# Patient Record
Sex: FEMALE | Race: WHITE | NOT HISPANIC OR LATINO | URBAN - METROPOLITAN AREA
[De-identification: names, ages, dates, MRNs, and addresses within clinical notes are randomized per-mention and may not be internally consistent; named-entity substitution may affect disease eponyms.]

---

## 2017-12-22 ENCOUNTER — INPATIENT (INPATIENT)
Facility: HOSPITAL | Age: 62
LOS: 3 days | Discharge: HOME | DRG: 123 | End: 2017-12-26
Attending: PSYCHIATRY & NEUROLOGY | Admitting: PSYCHIATRY & NEUROLOGY
Payer: COMMERCIAL

## 2017-12-22 VITALS
WEIGHT: 164.91 LBS | OXYGEN SATURATION: 98 % | SYSTOLIC BLOOD PRESSURE: 112 MMHG | HEART RATE: 77 BPM | TEMPERATURE: 98 F | RESPIRATION RATE: 18 BRPM | DIASTOLIC BLOOD PRESSURE: 66 MMHG

## 2017-12-22 LAB
ANION GAP SERPL CALC-SCNC: 12 MMOL/L — SIGNIFICANT CHANGE UP (ref 5–17)
BUN SERPL-MCNC: 12 MG/DL — SIGNIFICANT CHANGE UP (ref 7–23)
CALCIUM SERPL-MCNC: 9.9 MG/DL — SIGNIFICANT CHANGE UP (ref 8.4–10.5)
CHLORIDE SERPL-SCNC: 103 MMOL/L — SIGNIFICANT CHANGE UP (ref 96–108)
CO2 SERPL-SCNC: 27 MMOL/L — SIGNIFICANT CHANGE UP (ref 22–31)
CREAT SERPL-MCNC: 0.91 MG/DL — SIGNIFICANT CHANGE UP (ref 0.5–1.3)
GLUCOSE SERPL-MCNC: 103 MG/DL — HIGH (ref 70–99)
POTASSIUM SERPL-MCNC: 4.2 MMOL/L — SIGNIFICANT CHANGE UP (ref 3.5–5.3)
POTASSIUM SERPL-SCNC: 4.2 MMOL/L — SIGNIFICANT CHANGE UP (ref 3.5–5.3)
SODIUM SERPL-SCNC: 142 MMOL/L — SIGNIFICANT CHANGE UP (ref 135–145)

## 2017-12-22 PROCEDURE — 99220: CPT

## 2017-12-22 RX ORDER — SODIUM CHLORIDE 9 MG/ML
3 INJECTION INTRAMUSCULAR; INTRAVENOUS; SUBCUTANEOUS EVERY 8 HOURS
Qty: 0 | Refills: 0 | Status: DISCONTINUED | OUTPATIENT
Start: 2017-12-22 | End: 2017-12-26

## 2017-12-22 RX ADMIN — SODIUM CHLORIDE 3 MILLILITER(S): 9 INJECTION INTRAMUSCULAR; INTRAVENOUS; SUBCUTANEOUS at 20:56

## 2017-12-22 NOTE — ED CDU PROVIDER INITIAL DAY NOTE - DETAILS
vital signs q8h, pain control, MRI, Ophthalmology consult, frequent re-evaluations  case d/w Dr. Fox

## 2017-12-22 NOTE — ED PROVIDER NOTE - OBJECTIVE STATEMENT
62F with no reported PMH presents to the ED with L eye pain.  Reports that the pain started three days ago.  Reports pain worse with looking to the L.  Reports that yesterday noted shimmering in her L peripheral vision as well as a gray bar in central vision.  Reports went to optometrist today and was sent to the ED for concern for optic neuritis.  Denies fevers, chills, dizziness, weakness.  Denies double vision, sudden loss of vision. Denies numbness/weakness/tingling in extremities. Denies headache, back pain, neck pain.  Denies chest pain, shortness of breath, palpitations. Denies abdominal pain, nausea, vomiting, diarrhea, blood in stools. Denies dysuria, hematuria, change in urinary habits including frequency, urgency. Patient brought VF.  On exam, NAD, head NCAT, PERRL, EOMI, confrontational VF full, Va cc OD 20/20, OS 20/20, color plates on phone: full, no periorbital ecchymosis, no tenderness to palpation of orbital rim, lid margins clear, no retained foreign body on lid eversion, no conjunctival injection, no corneal defect, AC no cells or flare, fundus exam limited, disc margins sharp and flat, FROM at neck, no tenderness to palpation or stepoffs along length of spine, lungs CTAB with good inspiratory effort, +S1S2, no m/r/g, abdomen soft, NT, moving all extremities with 5/5 strength bilateral upper and lower extremities, good and equal  strength bilaterally; A/P: 62F with visual complaints with no acute findings for me, will consult ophtho as was sent in for optic neuritis, will await

## 2017-12-22 NOTE — ED PROVIDER NOTE - PROGRESS NOTE DETAILS
Attending MD Fox: Whit in ED evaluating patient Attending MD Fox: Seen by ophtho, low suspicion for optic neuritis but recommend MRI.  Spoke to CDU will send to CDU. Attending MD Fox: Seen by ophtho, low suspicion for optic neuritis but recommend MRI.  No other labs needed. Spoke to CDU will send to CDU.

## 2017-12-22 NOTE — ED ADULT NURSE NOTE - PSH
Herniated Disc  lumbar, s/p discectomy  History of Bladder Repair Surgery  s/p bladder suspension  S/P Tubal Ligation

## 2017-12-22 NOTE — ED ADULT NURSE NOTE - CHPI ED SYMPTOMS NEG
no discharge/no drainage/no itching/no photophobia/no double vision/no foreign body/no eye lid swelling/no purulent drainage

## 2017-12-22 NOTE — ED CDU PROVIDER INITIAL DAY NOTE - PHYSICAL EXAMINATION
Eye exam could not be performed as patient's pupils were dilated at time of exam.  please see Ophthalmology note for full exam.

## 2017-12-22 NOTE — ED CDU PROVIDER INITIAL DAY NOTE - ATTENDING CONTRIBUTION TO CARE
Attending MD Fox:   I personally have seen and examined this patient.  Physician assistant note reviewed and agree on plan of care and except where noted.  See below for details.     62F with no reported PMH presents to the ED with L eye pain.  Reports that the pain started three days ago.  Reports pain worse with looking to the L.  Reports that yesterday noted shimmering in her L peripheral vision as well as a gray bar in central vision.  Reports went to optometrist today and was sent to the ED for concern for optic neuritis.  Denies fevers, chills, dizziness, weakness.  Denies double vision, sudden loss of vision. Denies numbness/weakness/tingling in extremities. Denies headache, back pain, neck pain.  Denies chest pain, shortness of breath, palpitations. Denies abdominal pain, nausea, vomiting, diarrhea, blood in stools. Denies dysuria, hematuria, change in urinary habits including frequency, urgency. Patient brought VF.  On exam, NAD, head NCAT, PERRL, EOMI, confrontational VF full, Va cc OD 20/20, OS 20/20, color plates on phone: full, no periorbital ecchymosis, no tenderness to palpation of orbital rim, lid margins clear, no retained foreign body on lid eversion, no conjunctival injection, no corneal defect, AC no cells or flare, fundus exam limited, disc margins sharp and flat, FROM at neck, no tenderness to palpation or stepoffs along length of spine, lungs CTAB with good inspiratory effort, +S1S2, no m/r/g, abdomen soft, NT, moving all extremities with 5/5 strength bilateral upper and lower extremities, good and equal  strength bilaterally; A/P: 62F with visual complaints with no acute findings for me, seen by ophtho, low suspicion for optic neuritis, but recommend MRI, will send to CDU for MRI and re-eval by ophtho +/- neuro pending resolution or persistence of symptoms

## 2017-12-22 NOTE — CONSULT NOTE ADULT - SUBJECTIVE AND OBJECTIVE BOX
[facility] Consult Note    HPI: 63yo F no pmh sent in by ophthalmic consults of [place] to rule out optic neuritis of the left eye. Pt reports mild 2/10 pain OS when looking to left side. And some "shimmering" in peripheral field of vision x3 days. No other complaints. No flashes/floaters. No changes in vision. No HA. HVF (unreliable) at office today showed inf nasal step OS, full OD.    PMH: None  Meds: None  POcHx (including surgeries/lasers/trauma):  None  Drops: None  FamHx: None  Social Hx: None  Allergies: NKDA    ROS:  General (neg), Vision (per HPI), Head and Neck (neg), Pulm (neg), CV (neg), GI (neg),  (neg), Musculoskeletal (neg), Skin/Integ (neg), Neuro (neg), Endocrine (neg), Heme (neg), All/Immuno (neg)    Mood and Affect Appropriate ( x3 ),  Oriented to Time, Place, and Person x 3 (    Ophthalmology Exam    Visual acuity cc near: 20/20  Pupils: PERRL OU, no APD  Ttono: 12 OU  Extraocular movements (EOMs): Full OU, no diplopia  Confrontational Visual Field (CVF):  Full OU  Color Plates: 12/12 OU    Slit Lamp Exam (SLE)  External:  Flat OU  Lids/Lashes/Lacrimal Ducts: Flat OU    Sclera/Conjunctiva:  W+Q OU  Cornea: Cl OU  Anterior Chamber: D+F OU   Iris:  Flat OU  Lens:  Cl OU    Fundus Exam: dilated with 1% tropicamide and 2.5% phenylephrine   Approval obtained from primary team for dilation  Patient aware that pupils can remained dilated for at least 4-6 hours  Exam performed with 20D lens    Vitreous: wnl OU  Cup/Disc: 0.3 OU  Macula:  wnl OU  Vessels:  wnl OU  Periphery: wnl OU    Assessment/Plan: 63yo F with mild pain OS with EOM. VA excellent, colors full, no APD. Nerves sharp and pink OU. Very low suspicion for optic neuritis.  - recommend obtaining MRI orbit left eye with contrast (optic neuritis protocol) to rule out retrobulbar optic neuritis, please page ophtho oncall with results  - pt to follow up with her private ophthalmologist this tuesday, or with our office if she wishes    Case discussed with Dr. Hollins    [facility]   [address] [place] [address].  [place] [address]  [phone] (practice) or [phone] (clinic)

## 2017-12-22 NOTE — ED CDU PROVIDER INITIAL DAY NOTE - OBJECTIVE STATEMENT
61 yo female with no PMHx p/w left eye pain.  The patient reports that she developed left eye pain with lateral gaze 3 days ago.  Pain 2/10 in severity.  Yesterday she noted some blurring of her peripheral vision of the left eye.  Went to optometrist today who sent her to the ED for concern for optic neuritis.  Patient denies fevers/chills, CP, SOB, abd pain, NVDC

## 2017-12-22 NOTE — ED ADULT NURSE NOTE - OBJECTIVE STATEMENT
Patient presents to ED sent from eye doctor for blurry vision and soreness in the left eye. Patient denies injury to the eye. There is no ecchymosis, bruising, redness, Patient presents to ED sent from eye doctor for blurry vision and soreness in the left eye. Patient denies injury to the eye. There is no ecchymosis, bruising, redness, or swelling to the affected eye. Patient c/o blurry vision and states "it feels like I am wearing glasses and one side is smudged". Patient denies itchiness in the eye. There is no apparent foreign body in the eye. There is no drainage or tearing. There is no acute distress at this time. Safety and comfort maintained. Will continue to monitor.

## 2017-12-23 DIAGNOSIS — H53.9 UNSPECIFIED VISUAL DISTURBANCE: ICD-10-CM

## 2017-12-23 LAB
BASOPHILS # BLD AUTO: 0.1 K/UL — SIGNIFICANT CHANGE UP (ref 0–0.2)
BASOPHILS NFR BLD AUTO: 0.9 % — SIGNIFICANT CHANGE UP (ref 0–2)
EOSINOPHIL # BLD AUTO: 0.2 K/UL — SIGNIFICANT CHANGE UP (ref 0–0.5)
EOSINOPHIL NFR BLD AUTO: 1.9 % — SIGNIFICANT CHANGE UP (ref 0–6)
ERYTHROCYTE [SEDIMENTATION RATE] IN BLOOD: 9 MM/HR — SIGNIFICANT CHANGE UP (ref 0–20)
HCT VFR BLD CALC: 40.3 % — SIGNIFICANT CHANGE UP (ref 34.5–45)
HGB BLD-MCNC: 14 G/DL — SIGNIFICANT CHANGE UP (ref 11.5–15.5)
LYMPHOCYTES # BLD AUTO: 2.7 K/UL — SIGNIFICANT CHANGE UP (ref 1–3.3)
LYMPHOCYTES # BLD AUTO: 32.2 % — SIGNIFICANT CHANGE UP (ref 13–44)
MCHC RBC-ENTMCNC: 30.9 PG — SIGNIFICANT CHANGE UP (ref 27–34)
MCHC RBC-ENTMCNC: 34.7 GM/DL — SIGNIFICANT CHANGE UP (ref 32–36)
MCV RBC AUTO: 88.8 FL — SIGNIFICANT CHANGE UP (ref 80–100)
MONOCYTES # BLD AUTO: 0.6 K/UL — SIGNIFICANT CHANGE UP (ref 0–0.9)
MONOCYTES NFR BLD AUTO: 7.5 % — SIGNIFICANT CHANGE UP (ref 2–14)
NEUTROPHILS # BLD AUTO: 4.9 K/UL — SIGNIFICANT CHANGE UP (ref 1.8–7.4)
NEUTROPHILS NFR BLD AUTO: 57.6 % — SIGNIFICANT CHANGE UP (ref 43–77)
PLATELET # BLD AUTO: 301 K/UL — SIGNIFICANT CHANGE UP (ref 150–400)
RBC # BLD: 4.54 M/UL — SIGNIFICANT CHANGE UP (ref 3.8–5.2)
RBC # FLD: 11.8 % — SIGNIFICANT CHANGE UP (ref 10.3–14.5)
WBC # BLD: 8.4 K/UL — SIGNIFICANT CHANGE UP (ref 3.8–10.5)
WBC # FLD AUTO: 8.4 K/UL — SIGNIFICANT CHANGE UP (ref 3.8–10.5)

## 2017-12-23 PROCEDURE — 71020: CPT | Mod: 26

## 2017-12-23 PROCEDURE — 70543 MRI ORBT/FAC/NCK W/O &W/DYE: CPT | Mod: 26

## 2017-12-23 PROCEDURE — 99217: CPT

## 2017-12-23 RX ORDER — PANTOPRAZOLE SODIUM 20 MG/1
40 TABLET, DELAYED RELEASE ORAL
Qty: 0 | Refills: 0 | Status: DISCONTINUED | OUTPATIENT
Start: 2017-12-23 | End: 2017-12-26

## 2017-12-23 RX ORDER — INFLUENZA VIRUS VACCINE 15; 15; 15; 15 UG/.5ML; UG/.5ML; UG/.5ML; UG/.5ML
0.5 SUSPENSION INTRAMUSCULAR ONCE
Qty: 0 | Refills: 0 | Status: DISCONTINUED | OUTPATIENT
Start: 2017-12-23 | End: 2017-12-26

## 2017-12-23 RX ORDER — ACETAMINOPHEN 500 MG
975 TABLET ORAL ONCE
Qty: 0 | Refills: 0 | Status: COMPLETED | OUTPATIENT
Start: 2017-12-23 | End: 2017-12-23

## 2017-12-23 RX ADMIN — Medication 975 MILLIGRAM(S): at 15:46

## 2017-12-23 RX ADMIN — SODIUM CHLORIDE 3 MILLILITER(S): 9 INJECTION INTRAMUSCULAR; INTRAVENOUS; SUBCUTANEOUS at 13:15

## 2017-12-23 RX ADMIN — SODIUM CHLORIDE 3 MILLILITER(S): 9 INJECTION INTRAMUSCULAR; INTRAVENOUS; SUBCUTANEOUS at 05:03

## 2017-12-23 RX ADMIN — Medication 975 MILLIGRAM(S): at 11:32

## 2017-12-23 RX ADMIN — SODIUM CHLORIDE 3 MILLILITER(S): 9 INJECTION INTRAMUSCULAR; INTRAVENOUS; SUBCUTANEOUS at 21:49

## 2017-12-23 NOTE — H&P ADULT - ATTENDING COMMENTS
Patient personally seen and examined and agree with above.  C/T spine to be done today and will f/u.  LP for further w/u and LP to be performed.

## 2017-12-23 NOTE — ED ADULT NURSE REASSESSMENT NOTE - MUSCULOSKELETAL WDL
See phone message 11-15-16. Detailed message from 's office. Had called [facility] at home and was told pt needs sleep study notes from provider that conducted sleep study. Patient was informed.   Full range of motion of upper and lower extremities, no joint tenderness/swelling.

## 2017-12-23 NOTE — CONSULT NOTE ADULT - SUBJECTIVE AND OBJECTIVE BOX
Ophthalmology follow up note    Subjective: No pain, no changes in vision. Feels same as yesterday. Still with mild pain OS with looking laterally, improved from yesterday. Still sees "shimmers" in peripheral field of vision and gray line in center of vision     Ophthalmology Exam    Visual acuity cc near: 20/20  Pupils: brisk and reactive OD, 2+ APD OS  Ttono: 12 OU  Extraocular movements (EOMs): Full OU, no diplopia  Confrontational Visual Field (CVF):  Full OU  Color Plates: 12/12 OU  No red desaturation    Slit Lamp Exam (SLE)  External:  Flat OU  Lids/Lashes/Lacrimal Ducts: Flat OU    Sclera/Conjunctiva:  W+Q OU  Cornea: Cl OU  Anterior Chamber: D+F OU   Iris:  Flat OU  Lens:  Cl OU    Fundus Exam: dilated with 1% tropicamide and 2.5% phenylephrine   Approval obtained from primary team for dilation  Patient aware that pupils can remained dilated for at least 4-6 hours  Exam performed with 20D lens    Vitreous: wnl OU  Cup/Disc: sharp and pink, 0.3 OU  Macula:  wnl OU  Vessels:  wnl OU  Periphery: wnl OU    MRI ORBIT: Evidence of T2 signal hyperintensity in the left optic nerve in its   intraorbital segment with some associated enhancement consistent with   optic neuritis. Intracranially there is evidence of signal hyperintensity   on T2 and FLAIR in the periventricular and subcortical white matter.   These lesions may be on the basis of demyelinating pathology given the   presence of optic neuritis though ischemic infectious or inflammatory   etiologies are not excluded and can be a cause of optic neuritis as well.   Of note the intracranial lesions do not enhance. The intracranial lesions   also do not demonstrate signal hyperintensity on the diffusion-weighted   sequence.      Assessment/Plan: 63yo F with mild pain OS with EOM and 2+ APD OS. VA excellent, colors full. No signs of nerve swelling seen clinically however MRI with evidence of optic neuritis OS. There is T2 signal hyperintensity in left optic nerve as well as periventricular and subcortical white matter. Will need to eval for inflammatory vs infectious etiology.  - recommend neurology consult  - please obtain CBC, ESR, ACE, lyme antibody, RPR, chest x ray  - pt to follow up with her private ophthalmologist within 1wk from discharge

## 2017-12-23 NOTE — H&P ADULT - ASSESSMENT
63yo F no pmh sent in by ophthalmic consults of [place] to rule out optic neuritis of the left eye. Pt reports mild 2/10 pain in left eye when looking to left side. And some "shimmering" in peripheral field of vision x3 days. No other complaints. No flashes/floaters. No changes in vision. No HA. MRI showed evidence of optic neuritis.     Impression     Optic neuritis due to MS vs NMO     Plan     Solumedrol 1 gram for 3 days or 5 days depends on patient's clinical improvement   Pantoprazole   Lumbar puncture and CSF analysis ( csf count, glucose, protein, OCB, myelin basic protein, csf index )   MRI spine Cervical and thoracic spine with contrast   NMO antibody   MELIA   DVT prophylaxis

## 2017-12-23 NOTE — ED ADULT NURSE REASSESSMENT NOTE - GENERAL PATIENT STATE
smiling/interactive/comfortable appearance/cooperative
comfortable appearance
comfortable appearance/cooperative/smiling/interactive

## 2017-12-23 NOTE — ED CDU PROVIDER SUBSEQUENT DAY NOTE - PROGRESS NOTE DETAILS
Patient seen at bedside in NAD.  VSS.  Patient resting comfortably.  Sleeping.  -Glenn Duron PA-C Patient seen and evaluated at bedside. Resting comfortably, NAD. Still reports L eye pain with lateral gaze and "shimmering" in peripheral field of vision. Also c/o mild HA. Denies fever/chills, central vision changes, numbness/tingling. VSS. Plan for MRI today. Patient resting comfortably in NAD. No change in symptoms. VSS. Pending MRI results. MRI significant for optic neuritis. Optho resident aware and will come see patient in CDU. MRI significant for optic neuritis with additional subcortical and periventricular signal hyperintensity signal hyperintensity. Optho resident aware and will come see patient in CDU. Neuro paged. Patient re-evaluated by ophthalmology resident. Awaiting final recs, however reports patient plan pending neuro evaluation. Spoke with neuro resident again reports he will see patient in CDU shortly, pt likely to be admitted to neuro service. Patient re-evaluated by ophthalmology resident. Awaiting final recs, however reports patient plan and steroid tx pending neuro evaluation. Spoke with neuro resident again reports he will see patient in CDU shortly, pt likely to be admitted to neuro service. MRI significant for optic neuritis with additional subcortical and periventricular signal hyperintensity. Optho resident aware and will come see patient in CDU. Neuro paged. Attending MD Mckeon.  Pt has been seen by optho and MRI c/w optic neuritis.  She is stable and pending neuro consult at time of signout to Dr. Fierro.  Neuro recommending likely admission and probable high dose steroids but recommending hold admission/steroids until they see pt.  Neuro has been called several times by AVA Fitzgerald and have several strokes ahead of this pt.  She remains stable pending neuro eval. CDU NOTE AVA FERRIS: Pt resting comfortably, states vision is unchanged, has line across vision on L eye with some blurriness. still has some mild eye pain of L eye when looking left. NAD, VSS. Eyes still dilated from optho exam, EOMI b/l. pt seen by neuro, recommend admission. case and plan discussed with Dr. Fierro; agrees with admission.

## 2017-12-23 NOTE — ED ADULT NURSE REASSESSMENT NOTE - NS ED NURSE REASSESS COMMENT FT1
17.00 Pt  completed her tests Opthalmic team evaluated the pt  due bloods sent Pt denies N/V/D fever chills cp sob Afebrile here  Pt is awaiting for Neuro consult continue to monitor
Pt asleep on stretcher, no complaints, VSS, monitoring continues, safety and comfort maintained.
Pt asleep on stretcher, no complaints, VSS, monitoring continues. Safety and comfort maintained.
opthalmology in eye room with pt
7.30 Am Pt is reassessed Nasreen N/V/D fever chills CP SOB afebrile here Pt looks comfortable. Continue to monitor   08.30 Am  Pt went for MRI
Received pt from JACKIE Pimentel (fast track), received pt alert and responsive, oriented x4, denies any respiratory distress, SOB, or difficulty breathing. Pt transferred to CDU for visual disturbance/ blurred vision and pain to L eye. Pt states she sees "sparkles" and vision is blurred from L eye and pain with L eye movement, denies pain when eyes looking forward no movement. Pt is pending MRI orbits, pt aware.  IV in place, patent and free of signs of infiltration,  pt denies chest pain or palpitations, V/S stable, pt afebrile, pt denies pain at this time, no distress noted,  no other deficits noted at this time.  Pt educated on unit and unit rules, instructed patient to notify RN of any needed assistance, Pt verbalizes understanding, Call bell placed within reach. Safety maintained. Will continue to monitor.
Pt received from JACKIE Rutledge. Pt oriented to CDU & plan of care was discussed. Pt states she feels the same. She has 3-4/10 L eye soreness and states "it feels like a black & blue". Pt states the eye is also blurry but denies any double vision. Safety & comfort measures maintained. Call bell in reach. Will continue to monitor.

## 2017-12-23 NOTE — ED CDU PROVIDER SUBSEQUENT DAY NOTE - MEDICAL DECISION MAKING DETAILS
Attending MD Mckeon.  Pt seen and examined by me on morning of 12/23 in CDU observation unit.  Pt is a 62 yr old female who presented to Ed with complaint of L visual changes.  Pt has no other medical hx.  Pt endorses L eye pain with lateral gaze and vision changes she describes as ‘shimmering’ isolated to L eye.  No involvement of R eye.  No numbness/tingling/fevers.  Pt saw her optho who was concerned for optic neuritis.  Optho in ED did not feel likely optic neuritis but recommended MRI. Pt is in MRI at this time to return and be reassessed/obtain results.  Plan to rule-out retrobulbar optic neuritis.  If non-actionable MRI plan to d/c with optho follow-up.  Will call optho with MRI results.

## 2017-12-23 NOTE — H&P ADULT - HISTORY OF PRESENT ILLNESS
61yo F no pmh sent in by ophthalmic consults of [place] to rule out optic neuritis of the left eye. Pt reports mild 2/10 pain in left eye when looking to left side. And some "shimmering" in peripheral field of vision x3 days. No other complaints. No flashes/floaters. No changes in vision. No HA.     She denied any weakness, numbness, change in speech or coordination and headache. No family h/o for MS and NMO or any demyelinating disease.

## 2017-12-23 NOTE — ED CDU PROVIDER DISPOSITION NOTE - CLINICAL COURSE
61 yo female with no PMHx p/w left eye pain.  The patient reports that she developed left eye pain with lateral gaze 3 days ago.  Pain 2/10 in severity.  Yesterday she noted some blurring of her peripheral vision of the left eye.  Went to optometrist today who sent her to the ED for concern for optic neuritis.  Patient denies fevers/chills, CP, SOB, abd pain, NVDC.   In the ED VSS.  Patient was evaluated by Ophthalmology who recommended an MRI to r/o optic neuritis.  Patient was placed in the CDU.  MRI................... 61 yo female with no PMHx p/w left eye pain.  The patient reports that she developed left eye pain with lateral gaze 3 days ago.  Pain 2/10 in severity.  Yesterday she noted some blurring of her peripheral vision of the left eye.  Went to optometrist today who sent her to the ED for concern for optic neuritis.  Patient denies fevers/chills, CP, SOB, abd pain, NVDC.   In the ED VSS.  Patient was evaluated by Ophthalmology who recommended an MRI to r/o optic neuritis.  Patient was placed in the CDU.  No change in symptoms during CDU stay. MRI orbit significant for optic neuritis with additional subcortical and periventricular signal hyperintensity.  Seen by ophtho and neurology. Neuro ... 61 yo female with no PMHx p/w left eye pain.  The patient reports that she developed left eye pain with lateral gaze 3 days ago.  Pain 2/10 in severity.  Yesterday she noted some blurring of her peripheral vision of the left eye.  Went to optometrist today who sent her to the ED for concern for optic neuritis.  Patient denies fevers/chills, CP, SOB, abd pain, NVDC.   In the ED VSS.  Patient was evaluated by Ophthalmology who recommended an MRI to r/o optic neuritis.  Patient was placed in the CDU.  No change in symptoms during CDU stay. MRI orbit significant for optic neuritis with additional subcortical and periventricular signal hyperintensity.  Seen by ophtho and neurology. Neuro recommending admission.

## 2017-12-23 NOTE — ED CDU PROVIDER SUBSEQUENT DAY NOTE - HISTORY
no interval change from initial CDU provider note.  Patient seen at bedside in NAD.  VSS.  Patient resting comfortably without complaints. -Glenn Duron PA-C

## 2017-12-23 NOTE — ED ADULT NURSE REASSESSMENT NOTE - COMFORT CARE
po fluids offered/repositioned/warm blanket provided/ambulated to bathroom/plan of care explained/darkened lights
meal provided/plan of care explained/po fluids offered
side rails up/assisted to bathroom/plan of care explained/po fluids offered/repositioned

## 2017-12-23 NOTE — ED CDU PROVIDER DISPOSITION NOTE - ATTENDING CONTRIBUTION TO CARE
Attending MD Mckeon.  Pt seen and examined by me on morning of 12/23 in CDU observation unit.  Pt is a 62 yr old female who presented to Ed with complaint of L visual changes.  Pt has no other medical hx.  Pt endorses L eye pain with lateral gaze and vision changes she describes as ‘shimmering’ isolated to L eye.  No involvement of R eye.  No numbness/tingling/fevers.  Pt saw her optho who was concerned for optic neuritis.  Optho in ED did not feel likely optic neuritis but recommended MRI. Pt is in MRI at this time to return and be reassessed/obtain results.  Plan to rule-out retrobulbar optic neuritis.  If non-actionable MRI plan to d/c with optho follow-up.  Will call optho with MRI results. KHURRAM: I have personally performed a face to face diagnostic evaluation on this patient.  I have reviewed the ACP note and agree with the history, exam, and plan of care, except as noted.  History and Exam by me shows pt with eye pain on lateral gaze, optometrist concerned for optic neuritis, sent to ED. on exam, well appearing female.CN2-12 intact. sent to cdu for MRI/neuro/ophtho. recommend admission for high dose steroids.

## 2017-12-23 NOTE — H&P ADULT - NSHPLABSRESULTS_GEN_ALL_CORE
14.0   8.4   )-----------( 301      ( 23 Dec 2017 18:04 )             40.3   12-22    142  |  103  |  12  ----------------------------<  103<H>  4.2   |  27  |  0.91    Ca    9.9      22 Dec 2017 21:06      < from: MR Orbits w/wo IV Cont (12.23.17 @ 09:53) >    Evidence of T2 signal hyperintensity in the left optic nerve in its   intraorbital segment with some associated enhancement consistent with   optic neuritis. Intracranially there is evidence of signal hyperintensity   on T2 and FLAIR in the periventricular and subcortical white matter.   These lesions may be on the basis of demyelinating pathology given the   presence of optic neuritis though ischemic infectious or inflammatory   etiologies are not excluded and can be a cause of optic neuritis as well.   Of note the intracranial lesions do not enhance. The intracranial lesions   also do not demonstrate signal hyperintensity on the diffusion-weighted   sequence.      < end of copied text >

## 2017-12-23 NOTE — ED CDU PROVIDER DISPOSITION NOTE - PLAN OF CARE
1.  Stay hydrated  2.  Follow up with your PMD in 2-3 days.  bring a copy of your results with you to your appointment       Follow up with your Ophthalmologist upon discharge.  3.  Return to the ER for visual changes, worsening eye pain, fevers/chills or any other concerning symptoms

## 2017-12-24 DIAGNOSIS — G96.19 OTHER DISORDERS OF MENINGES, NOT ELSEWHERE CLASSIFIED: ICD-10-CM

## 2017-12-24 LAB
ALBUMIN SERPL ELPH-MCNC: 3.9 G/DL — SIGNIFICANT CHANGE UP (ref 3.3–5)
ALP SERPL-CCNC: 56 U/L — SIGNIFICANT CHANGE UP (ref 40–120)
ALT FLD-CCNC: 21 U/L — SIGNIFICANT CHANGE UP (ref 10–45)
ANA TITR SER: NEGATIVE — SIGNIFICANT CHANGE UP
ANION GAP SERPL CALC-SCNC: 12 MMOL/L — SIGNIFICANT CHANGE UP (ref 5–17)
AST SERPL-CCNC: 17 U/L — SIGNIFICANT CHANGE UP (ref 10–40)
B BURGDOR C6 AB SER-ACNC: NEGATIVE — SIGNIFICANT CHANGE UP
B BURGDOR IGG+IGM SER-ACNC: 0.11 INDEX — SIGNIFICANT CHANGE UP (ref 0.01–0.89)
BASOPHILS # BLD AUTO: 0.03 K/UL — SIGNIFICANT CHANGE UP (ref 0–0.2)
BASOPHILS NFR BLD AUTO: 0.4 % — SIGNIFICANT CHANGE UP (ref 0–2)
BILIRUB SERPL-MCNC: 0.3 MG/DL — SIGNIFICANT CHANGE UP (ref 0.2–1.2)
BUN SERPL-MCNC: 15 MG/DL — SIGNIFICANT CHANGE UP (ref 7–23)
CALCIUM SERPL-MCNC: 9.2 MG/DL — SIGNIFICANT CHANGE UP (ref 8.4–10.5)
CHLORIDE SERPL-SCNC: 105 MMOL/L — SIGNIFICANT CHANGE UP (ref 96–108)
CO2 SERPL-SCNC: 23 MMOL/L — SIGNIFICANT CHANGE UP (ref 22–31)
CREAT SERPL-MCNC: 0.91 MG/DL — SIGNIFICANT CHANGE UP (ref 0.5–1.3)
EOSINOPHIL # BLD AUTO: 0.18 K/UL — SIGNIFICANT CHANGE UP (ref 0–0.5)
EOSINOPHIL NFR BLD AUTO: 2.3 % — SIGNIFICANT CHANGE UP (ref 0–6)
GLUCOSE BLDC GLUCOMTR-MCNC: 208 MG/DL — HIGH (ref 70–99)
GLUCOSE BLDC GLUCOMTR-MCNC: 216 MG/DL — HIGH (ref 70–99)
GLUCOSE SERPL-MCNC: 102 MG/DL — HIGH (ref 70–99)
HCT VFR BLD CALC: 40.1 % — SIGNIFICANT CHANGE UP (ref 34.5–45)
HGB BLD-MCNC: 13.1 G/DL — SIGNIFICANT CHANGE UP (ref 11.5–15.5)
IMM GRANULOCYTES NFR BLD AUTO: 0.1 % — SIGNIFICANT CHANGE UP (ref 0–1.5)
LYMPHOCYTES # BLD AUTO: 3.18 K/UL — SIGNIFICANT CHANGE UP (ref 1–3.3)
LYMPHOCYTES # BLD AUTO: 40.7 % — SIGNIFICANT CHANGE UP (ref 13–44)
MCHC RBC-ENTMCNC: 28.4 PG — SIGNIFICANT CHANGE UP (ref 27–34)
MCHC RBC-ENTMCNC: 32.7 GM/DL — SIGNIFICANT CHANGE UP (ref 32–36)
MCV RBC AUTO: 87 FL — SIGNIFICANT CHANGE UP (ref 80–100)
MONOCYTES # BLD AUTO: 0.56 K/UL — SIGNIFICANT CHANGE UP (ref 0–0.9)
MONOCYTES NFR BLD AUTO: 7.2 % — SIGNIFICANT CHANGE UP (ref 2–14)
NEUTROPHILS # BLD AUTO: 3.86 K/UL — SIGNIFICANT CHANGE UP (ref 1.8–7.4)
NEUTROPHILS NFR BLD AUTO: 49.3 % — SIGNIFICANT CHANGE UP (ref 43–77)
PLATELET # BLD AUTO: 278 K/UL — SIGNIFICANT CHANGE UP (ref 150–400)
POTASSIUM SERPL-MCNC: 4.1 MMOL/L — SIGNIFICANT CHANGE UP (ref 3.5–5.3)
POTASSIUM SERPL-SCNC: 4.1 MMOL/L — SIGNIFICANT CHANGE UP (ref 3.5–5.3)
PROT SERPL-MCNC: 6.6 G/DL — SIGNIFICANT CHANGE UP (ref 6–8.3)
RBC # BLD: 4.61 M/UL — SIGNIFICANT CHANGE UP (ref 3.8–5.2)
RBC # FLD: 13.3 % — SIGNIFICANT CHANGE UP (ref 10.3–14.5)
SODIUM SERPL-SCNC: 140 MMOL/L — SIGNIFICANT CHANGE UP (ref 135–145)
T PALLIDUM AB TITR SER: NEGATIVE — SIGNIFICANT CHANGE UP
TSH SERPL-MCNC: 4.72 UIU/ML — HIGH (ref 0.27–4.2)
VIT B12 SERPL-MCNC: 330 PG/ML — SIGNIFICANT CHANGE UP (ref 232–1245)
WBC # BLD: 7.82 K/UL — SIGNIFICANT CHANGE UP (ref 3.8–10.5)
WBC # FLD AUTO: 7.82 K/UL — SIGNIFICANT CHANGE UP (ref 3.8–10.5)

## 2017-12-24 PROCEDURE — 99223 1ST HOSP IP/OBS HIGH 75: CPT

## 2017-12-24 PROCEDURE — 72156 MRI NECK SPINE W/O & W/DYE: CPT | Mod: 26

## 2017-12-24 PROCEDURE — 72157 MRI CHEST SPINE W/O & W/DYE: CPT | Mod: 26

## 2017-12-24 PROCEDURE — 88188 FLOWCYTOMETRY/READ 9-15: CPT

## 2017-12-24 RX ORDER — DEXTROSE 50 % IN WATER 50 %
25 SYRINGE (ML) INTRAVENOUS ONCE
Qty: 0 | Refills: 0 | Status: DISCONTINUED | OUTPATIENT
Start: 2017-12-24 | End: 2017-12-26

## 2017-12-24 RX ORDER — GLUCAGON INJECTION, SOLUTION 0.5 MG/.1ML
1 INJECTION, SOLUTION SUBCUTANEOUS ONCE
Qty: 0 | Refills: 0 | Status: DISCONTINUED | OUTPATIENT
Start: 2017-12-24 | End: 2017-12-26

## 2017-12-24 RX ORDER — INSULIN LISPRO 100/ML
VIAL (ML) SUBCUTANEOUS
Qty: 0 | Refills: 0 | Status: DISCONTINUED | OUTPATIENT
Start: 2017-12-24 | End: 2017-12-26

## 2017-12-24 RX ORDER — SODIUM CHLORIDE 9 MG/ML
1000 INJECTION, SOLUTION INTRAVENOUS
Qty: 0 | Refills: 0 | Status: DISCONTINUED | OUTPATIENT
Start: 2017-12-24 | End: 2017-12-26

## 2017-12-24 RX ORDER — DEXTROSE 50 % IN WATER 50 %
12.5 SYRINGE (ML) INTRAVENOUS ONCE
Qty: 0 | Refills: 0 | Status: DISCONTINUED | OUTPATIENT
Start: 2017-12-24 | End: 2017-12-26

## 2017-12-24 RX ORDER — DEXTROSE 50 % IN WATER 50 %
1 SYRINGE (ML) INTRAVENOUS ONCE
Qty: 0 | Refills: 0 | Status: DISCONTINUED | OUTPATIENT
Start: 2017-12-24 | End: 2017-12-26

## 2017-12-24 RX ADMIN — PANTOPRAZOLE SODIUM 40 MILLIGRAM(S): 20 TABLET, DELAYED RELEASE ORAL at 05:14

## 2017-12-24 RX ADMIN — SODIUM CHLORIDE 3 MILLILITER(S): 9 INJECTION INTRAMUSCULAR; INTRAVENOUS; SUBCUTANEOUS at 21:25

## 2017-12-24 RX ADMIN — Medication 58 MILLIGRAM(S): at 05:14

## 2017-12-24 RX ADMIN — SODIUM CHLORIDE 3 MILLILITER(S): 9 INJECTION INTRAMUSCULAR; INTRAVENOUS; SUBCUTANEOUS at 05:16

## 2017-12-24 RX ADMIN — Medication: at 18:13

## 2017-12-24 RX ADMIN — SODIUM CHLORIDE 3 MILLILITER(S): 9 INJECTION INTRAMUSCULAR; INTRAVENOUS; SUBCUTANEOUS at 13:27

## 2017-12-24 NOTE — CONSULT NOTE ADULT - PROBLEM SELECTOR RECOMMENDATION 9
if patient has symptoms such as lower extremity numbness/weakness, she can f/u with Dr. Phipps as an outpatient.

## 2017-12-24 NOTE — CONSULT NOTE ADULT - SUBJECTIVE AND OBJECTIVE BOX
HPI: 62yof p/w vision changes being worked up for optic neuritis got MRI spinal axis today showing incidental thoracic spine arachnoid cysts. She has no weakness, paresthesias, or difficulty walking.  currently on high dose IV steroids. vision improved since admission.    PMHx: as above  PSHx: none  Medications: dextrose 5%.  dextrose 50% Injectable  dextrose 50% Injectable  dextrose 50% Injectable  dextrose Gel PRN  glucagon  Injectable PRN  influenza   Vaccine  insulin lispro (HumaLOG) corrective regimen sliding scale  methylPREDNISolone sodium succinate IVPB  pantoprazole    Tablet  sodium chloride 0.9% lock flush    Allergies: nkda  Social history: lives with , retired  Family History: none applicable    VS: T(C): 36.8 (12-24-17 @ 15:27)  HR: 98 (12-24-17 @ 15:27)  BP: 111/70 (12-24-17 @ 15:27)  RR: 17 (12-24-17 @ 15:27)  SpO2: 95% (12-24-17 @ 15:27)  Wt(kg): --                          13.1   7.82  )-----------( 278      ( 24 Dec 2017 06:35 )             40.1     12-24    140  |  105  |  15  ----------------------------<  102<H>  4.1   |  23  |  0.91    Ca    9.2      24 Dec 2017 06:26    TPro  6.6  /  Alb  3.9  /  TBili  0.3  /  DBili  x   /  AST  17  /  ALT  21  /  AlkPhos  56  12-24        Exam:  AAOx3  5/5 throughout, no pronator drift  Sensation intact to light touch throughout  Reflexes 2+ throughout  No dysmetria

## 2017-12-25 LAB
APTT BLD: 29.9 SEC — SIGNIFICANT CHANGE UP (ref 27.5–37.4)
GLUCOSE BLDC GLUCOMTR-MCNC: 129 MG/DL — HIGH (ref 70–99)
GLUCOSE BLDC GLUCOMTR-MCNC: 201 MG/DL — HIGH (ref 70–99)
GLUCOSE BLDC GLUCOMTR-MCNC: 209 MG/DL — HIGH (ref 70–99)
GLUCOSE BLDC GLUCOMTR-MCNC: 211 MG/DL — HIGH (ref 70–99)
HBA1C BLD-MCNC: 5.7 % — HIGH (ref 4–5.6)
INR BLD: 0.96 RATIO — SIGNIFICANT CHANGE UP (ref 0.88–1.16)
PROTHROM AB SERPL-ACNC: 10.8 SEC — SIGNIFICANT CHANGE UP (ref 10–13.1)

## 2017-12-25 PROCEDURE — 99233 SBSQ HOSP IP/OBS HIGH 50: CPT

## 2017-12-25 RX ADMIN — Medication 2: at 18:13

## 2017-12-25 RX ADMIN — SODIUM CHLORIDE 3 MILLILITER(S): 9 INJECTION INTRAMUSCULAR; INTRAVENOUS; SUBCUTANEOUS at 05:40

## 2017-12-25 RX ADMIN — SODIUM CHLORIDE 3 MILLILITER(S): 9 INJECTION INTRAMUSCULAR; INTRAVENOUS; SUBCUTANEOUS at 13:01

## 2017-12-25 RX ADMIN — PANTOPRAZOLE SODIUM 40 MILLIGRAM(S): 20 TABLET, DELAYED RELEASE ORAL at 05:39

## 2017-12-25 RX ADMIN — SODIUM CHLORIDE 3 MILLILITER(S): 9 INJECTION INTRAMUSCULAR; INTRAVENOUS; SUBCUTANEOUS at 21:20

## 2017-12-25 RX ADMIN — Medication 58 MILLIGRAM(S): at 05:39

## 2017-12-25 RX ADMIN — Medication 2: at 13:01

## 2017-12-25 NOTE — PROGRESS NOTE ADULT - ASSESSMENT
63yo F here for optic neuritis. L APD on exam. MRI orbits consistent with optic neuritis T2, flair lesions non-enhancing bilaterally periventruiuclar and subcortically and ventrally at C2 raising suspicion for MS    Impression     Optic neuritis due to MS vs NMO     Plan     Continue with Solumedrol 1 gram for likely 3 days  LP at bedside attempted yesterday, unsuccessful will plan for Fluoro guided  NMO antibody  NSX consult for thoracic cord arachnoid cyst noted, nothing to do at this time   DVT prophylaxis

## 2017-12-26 ENCOUNTER — RESULT REVIEW (OUTPATIENT)
Age: 62
End: 2017-12-26

## 2017-12-26 ENCOUNTER — TRANSCRIPTION ENCOUNTER (OUTPATIENT)
Age: 62
End: 2017-12-26

## 2017-12-26 VITALS
SYSTOLIC BLOOD PRESSURE: 113 MMHG | OXYGEN SATURATION: 98 % | RESPIRATION RATE: 18 BRPM | TEMPERATURE: 98 F | HEART RATE: 59 BPM | DIASTOLIC BLOOD PRESSURE: 65 MMHG

## 2017-12-26 LAB
ACE SERPL-CCNC: 23 U/L — SIGNIFICANT CHANGE UP (ref 14–82)
ALBUMIN CSF-MCNC: 17.5 MG/DL — SIGNIFICANT CHANGE UP (ref 14–25)
ALBUMIN SERPL ELPH-MCNC: 4090 MG/DL — SIGNIFICANT CHANGE UP (ref 3500–5200)
ANION GAP SERPL CALC-SCNC: 15 MMOL/L — SIGNIFICANT CHANGE UP (ref 5–17)
APPEARANCE CSF: CLEAR — SIGNIFICANT CHANGE UP
APPEARANCE SPUN FLD: COLORLESS — SIGNIFICANT CHANGE UP
APTT BLD: 27 SEC — LOW (ref 27.5–37.4)
BUN SERPL-MCNC: 25 MG/DL — HIGH (ref 7–23)
CALCIUM SERPL-MCNC: 10.2 MG/DL — SIGNIFICANT CHANGE UP (ref 8.4–10.5)
CHLORIDE SERPL-SCNC: 98 MMOL/L — SIGNIFICANT CHANGE UP (ref 96–108)
CO2 SERPL-SCNC: 24 MMOL/L — SIGNIFICANT CHANGE UP (ref 22–31)
COLOR CSF: SIGNIFICANT CHANGE UP
CREAT SERPL-MCNC: 0.99 MG/DL — SIGNIFICANT CHANGE UP (ref 0.5–1.3)
CSF PCR RESULT: SIGNIFICANT CHANGE UP
GLUCOSE BLDC GLUCOMTR-MCNC: 116 MG/DL — HIGH (ref 70–99)
GLUCOSE BLDC GLUCOMTR-MCNC: 195 MG/DL — HIGH (ref 70–99)
GLUCOSE CSF-MCNC: 97 MG/DL — HIGH (ref 40–70)
GLUCOSE SERPL-MCNC: 122 MG/DL — HIGH (ref 70–99)
GRAM STN FLD: SIGNIFICANT CHANGE UP
HCT VFR BLD CALC: 38.9 % — SIGNIFICANT CHANGE UP (ref 34.5–45)
HGB BLD-MCNC: 13.1 G/DL — SIGNIFICANT CHANGE UP (ref 11.5–15.5)
IGG CSF-MCNC: 5.5 MG/DL — HIGH
IGG FLD-MCNC: 879 MG/DL — SIGNIFICANT CHANGE UP (ref 694–1618)
IGG SYNTH RATE SER+CSF CALC-MRATE: 15.7 MG/DAY — HIGH
IGG/ALB CLEAR SER+CSF-RTO: 1.5 — HIGH
IGG/ALB CSF: 0.31 RATIO — HIGH
IGG/ALB SER: 0.21 RATIO — SIGNIFICANT CHANGE UP
INR BLD: 0.93 RATIO — SIGNIFICANT CHANGE UP (ref 0.88–1.16)
LDH CSF L TO P-CCNC: 35 U/L — SIGNIFICANT CHANGE UP
LDH FLD-CCNC: 35 U/L — SIGNIFICANT CHANGE UP
LYMPHOCYTES # CSF: 74 % — SIGNIFICANT CHANGE UP (ref 40–80)
MCHC RBC-ENTMCNC: 28.9 PG — SIGNIFICANT CHANGE UP (ref 27–34)
MCHC RBC-ENTMCNC: 33.7 GM/DL — SIGNIFICANT CHANGE UP (ref 32–36)
MCV RBC AUTO: 85.7 FL — SIGNIFICANT CHANGE UP (ref 80–100)
MONOS+MACROS NFR CSF: 26 % — SIGNIFICANT CHANGE UP (ref 15–45)
NEUTROPHILS # CSF: 0 % — SIGNIFICANT CHANGE UP (ref 0–6)
NIGHT BLUE STAIN TISS: SIGNIFICANT CHANGE UP
NRBC NFR CSF: 2 /UL — SIGNIFICANT CHANGE UP (ref 0–5)
PLATELET # BLD AUTO: 341 K/UL — SIGNIFICANT CHANGE UP (ref 150–400)
POTASSIUM SERPL-MCNC: 4.2 MMOL/L — SIGNIFICANT CHANGE UP (ref 3.5–5.3)
POTASSIUM SERPL-SCNC: 4.2 MMOL/L — SIGNIFICANT CHANGE UP (ref 3.5–5.3)
PROT CSF-MCNC: 34 MG/DL — SIGNIFICANT CHANGE UP (ref 15–45)
PROTHROM AB SERPL-ACNC: 10.5 SEC — SIGNIFICANT CHANGE UP (ref 10–13.1)
RBC # BLD: 4.54 M/UL — SIGNIFICANT CHANGE UP (ref 3.8–5.2)
RBC # CSF: 0 /UL — SIGNIFICANT CHANGE UP (ref 0–0)
RBC # FLD: 13.3 % — SIGNIFICANT CHANGE UP (ref 10.3–14.5)
SODIUM SERPL-SCNC: 137 MMOL/L — SIGNIFICANT CHANGE UP (ref 135–145)
SPECIMEN SOURCE: SIGNIFICANT CHANGE UP
SPECIMEN SOURCE: SIGNIFICANT CHANGE UP
TUBE TYPE: SIGNIFICANT CHANGE UP
WBC # BLD: 25.66 K/UL — HIGH (ref 3.8–10.5)
WBC # FLD AUTO: 25.66 K/UL — HIGH (ref 3.8–10.5)

## 2017-12-26 PROCEDURE — 83873 ASSAY OF CSF PROTEIN: CPT

## 2017-12-26 PROCEDURE — 85652 RBC SED RATE AUTOMATED: CPT

## 2017-12-26 PROCEDURE — 86788 WEST NILE VIRUS AB IGM: CPT

## 2017-12-26 PROCEDURE — 71046 X-RAY EXAM CHEST 2 VIEWS: CPT

## 2017-12-26 PROCEDURE — 82945 GLUCOSE OTHER FLUID: CPT

## 2017-12-26 PROCEDURE — 86789 WEST NILE VIRUS ANTIBODY: CPT

## 2017-12-26 PROCEDURE — 85610 PROTHROMBIN TIME: CPT

## 2017-12-26 PROCEDURE — A9585: CPT

## 2017-12-26 PROCEDURE — 77003 FLUOROGUIDE FOR SPINE INJECT: CPT | Mod: 26

## 2017-12-26 PROCEDURE — 88108 CYTOPATH CONCENTRATE TECH: CPT | Mod: 26

## 2017-12-26 PROCEDURE — 88184 FLOWCYTOMETRY/ TC 1 MARKER: CPT

## 2017-12-26 PROCEDURE — 77003 FLUOROGUIDE FOR SPINE INJECT: CPT

## 2017-12-26 PROCEDURE — 80048 BASIC METABOLIC PNL TOTAL CA: CPT

## 2017-12-26 PROCEDURE — 84157 ASSAY OF PROTEIN OTHER: CPT

## 2017-12-26 PROCEDURE — 82164 ANGIOTENSIN I ENZYME TEST: CPT

## 2017-12-26 PROCEDURE — 70543 MRI ORBT/FAC/NCK W/O &W/DYE: CPT

## 2017-12-26 PROCEDURE — 86780 TREPONEMA PALLIDUM: CPT

## 2017-12-26 PROCEDURE — 86592 SYPHILIS TEST NON-TREP QUAL: CPT

## 2017-12-26 PROCEDURE — G0378: CPT

## 2017-12-26 PROCEDURE — 87476 LYME DIS DNA AMP PROBE: CPT

## 2017-12-26 PROCEDURE — 86038 ANTINUCLEAR ANTIBODIES: CPT

## 2017-12-26 PROCEDURE — 84443 ASSAY THYROID STIM HORMONE: CPT

## 2017-12-26 PROCEDURE — 84166 PROTEIN E-PHORESIS/URINE/CSF: CPT

## 2017-12-26 PROCEDURE — 62270 DX LMBR SPI PNXR: CPT

## 2017-12-26 PROCEDURE — 82962 GLUCOSE BLOOD TEST: CPT

## 2017-12-26 PROCEDURE — 83036 HEMOGLOBIN GLYCOSYLATED A1C: CPT

## 2017-12-26 PROCEDURE — 88185 FLOWCYTOMETRY/TC ADD-ON: CPT

## 2017-12-26 PROCEDURE — 99285 EMERGENCY DEPT VISIT HI MDM: CPT | Mod: 25

## 2017-12-26 PROCEDURE — 88108 CYTOPATH CONCENTRATE TECH: CPT

## 2017-12-26 PROCEDURE — 82607 VITAMIN B-12: CPT

## 2017-12-26 PROCEDURE — 83615 LACTATE (LD) (LDH) ENZYME: CPT

## 2017-12-26 PROCEDURE — 87205 SMEAR GRAM STAIN: CPT

## 2017-12-26 PROCEDURE — 85730 THROMBOPLASTIN TIME PARTIAL: CPT

## 2017-12-26 PROCEDURE — 86255 FLUORESCENT ANTIBODY SCREEN: CPT

## 2017-12-26 PROCEDURE — 86618 LYME DISEASE ANTIBODY: CPT

## 2017-12-26 PROCEDURE — 87070 CULTURE OTHR SPECIMN AEROBIC: CPT

## 2017-12-26 PROCEDURE — 72156 MRI NECK SPINE W/O & W/DYE: CPT

## 2017-12-26 PROCEDURE — 89051 BODY FLUID CELL COUNT: CPT

## 2017-12-26 PROCEDURE — 80053 COMPREHEN METABOLIC PANEL: CPT

## 2017-12-26 PROCEDURE — 87116 MYCOBACTERIA CULTURE: CPT

## 2017-12-26 PROCEDURE — 85027 COMPLETE CBC AUTOMATED: CPT

## 2017-12-26 PROCEDURE — 72157 MRI CHEST SPINE W/O & W/DYE: CPT

## 2017-12-26 PROCEDURE — 87483 CNS DNA AMP PROBE TYPE 12-25: CPT

## 2017-12-26 PROCEDURE — 99239 HOSP IP/OBS DSCHRG MGMT >30: CPT

## 2017-12-26 RX ADMIN — SODIUM CHLORIDE 3 MILLILITER(S): 9 INJECTION INTRAMUSCULAR; INTRAVENOUS; SUBCUTANEOUS at 05:15

## 2017-12-26 RX ADMIN — Medication 1: at 13:02

## 2017-12-26 RX ADMIN — PANTOPRAZOLE SODIUM 40 MILLIGRAM(S): 20 TABLET, DELAYED RELEASE ORAL at 06:53

## 2017-12-26 RX ADMIN — Medication 58 MILLIGRAM(S): at 05:21

## 2017-12-26 NOTE — DISCHARGE NOTE ADULT - HOSPITAL COURSE
62 year old Female no PMHx sent in by ophthalmic consults of [place] to rule out optic neuritis of the left eye. Initially mild 2/10 pain in left eye when looking to left side. And some "shimmering" in peripheral field of vision x3 days. No other complaints. No flashes/floaters. No changes in vision. No HA.  No family h/o for MS and NMO or any demyelinating disease. Patient admitted for optic neuritis.    Since admission, patient has had steady improvement in her vision, on solumedrol 1000 mg daily.  She reports significant improvement in pain, with vision still blurry but much better than initial presentation.  She completed the course of 3 days of steroid therapy.  MRI brain showing inflammation of the L optic nerve consistent with her symptoms, with possible demyelinating lesions in periventricular and subcortical white matter.  MRI cervical spine showing abnormal midline ventral intrinsic cord signal at C2 which in setting of optic neuritis suspicious for demyelinating lesion.     Lumbar puncture was attempted at bedside, however due to her previous spinal surgery, no CSF was collected, so she is scheduled for LP under fluoroscopy on 12/26.  Once LP performed with CSF analysis initiated, she may be discharged with followup at Dr Elmo Goldman neuro-immunology for results and further management .

## 2017-12-26 NOTE — DISCHARGE NOTE ADULT - PATIENT PORTAL LINK FT
“You can access the Follow[facility] Patient Portal, offered by [facility], by registering with the following website: [email or web address]”

## 2017-12-26 NOTE — DISCHARGE NOTE ADULT - PLAN OF CARE
Symptom management Patient should followup with neuro-immunology Dr Goldman for further management of her condition

## 2017-12-26 NOTE — DISCHARGE NOTE ADULT - CARE PLAN
Principal Discharge DX:	Optic neuritis  Goal:	Symptom management  Instructions for follow-up, activity and diet:	Patient should followup with neuro-immunology Dr Goldman for further management of her condition

## 2017-12-26 NOTE — PROGRESS NOTE ADULT - SUBJECTIVE AND OBJECTIVE BOX
S: Patient seen and examined at bedside.  No acute or overnight events.  Patient reporting improvement in her vision.  Patient for LP under fluoro today.    MEDICATIONS  (STANDING):  dextrose 5%. 1000 milliLiter(s) (50 mL/Hr) IV Continuous <Continuous>  dextrose 50% Injectable 12.5 Gram(s) IV Push once  dextrose 50% Injectable 25 Gram(s) IV Push once  dextrose 50% Injectable 25 Gram(s) IV Push once  influenza   Vaccine 0.5 milliLiter(s) IntraMuscular once  insulin lispro (HumaLOG) corrective regimen sliding scale   SubCutaneous three times a day before meals  methylPREDNISolone sodium succinate IVPB 1000 milliGRAM(s) IV Intermittent daily  pantoprazole    Tablet 40 milliGRAM(s) Oral before breakfast  sodium chloride 0.9% lock flush 3 milliLiter(s) IV Push every 8 hours    MEDICATIONS  (PRN):  dextrose Gel 1 Dose(s) Oral once PRN Blood Glucose LESS THAN 70 milliGRAM(s)/deciliter  glucagon  Injectable 1 milliGRAM(s) IntraMuscular once PRN Glucose LESS THAN 70 milligrams/deciliter                          13.1   7.82  )-----------( 278      ( 24 Dec 2017 06:35 )             40.1     12-24    140  |  105  |  15  ----------------------------<  102<H>  4.1   |  23  |  0.91    Ca    9.2      24 Dec 2017 06:26    TPro  6.6  /  Alb  3.9  /  TBili  0.3  /  DBili  x   /  AST  17  /  ALT  21  /  AlkPhos  56  12-24    CAPILLARY BLOOD GLUCOSE      POCT Blood Glucose.: 129 mg/dL (25 Dec 2017 08:24)  POCT Blood Glucose.: 208 mg/dL (24 Dec 2017 21:51)  POCT Blood Glucose.: 216 mg/dL (24 Dec 2017 17:34)    PT/INR - ( 25 Dec 2017 08:20 )   PT: 10.8 sec;   INR: 0.96 ratio         PTT - ( 25 Dec 2017 08:20 )  PTT:29.9 sec    I&O's Summary    24 Dec 2017 07:01  -  25 Dec 2017 07:00  --------------------------------------------------------  IN: 940 mL / OUT: 0 mL / NET: 940 mL      Vital Signs Last 24 Hrs  T(C): 36.7 (25 Dec 2017 07:37), Max: 36.8 (24 Dec 2017 15:27)  T(F): 98.1 (25 Dec 2017 07:37), Max: 98.2 (24 Dec 2017 15:27)  HR: 79 (25 Dec 2017 07:37) (76 - 98)  BP: 105/65 (25 Dec 2017 07:37) (103/58 - 156/88)  BP(mean): --  RR: 18 (25 Dec 2017 07:37) (17 - 18)  SpO2: 96% (25 Dec 2017 07:37) (94% - 99%)    PERRL; EOMI	  Breath Sounds equal & clear to percussion & auscultation, no accessory muscle use	  Regular rate & rhythm, normal S1, S2; no murmurs, gallops or rubs; no S3, S4	  Soft, non-tender, no hepatosplenomegaly, normal bowel sounds	  detailed exam	  AAO*3, no aphasia or dysarthria	  PEERL, EOMI, V1-v3 intact, no facial asymmetry	  intact 5/5 in all four extremities	  intact to light and vibration	    < from: MR Cervical Spine w/wo IV Cont (12.24.17 @ 09:43) >    INTERPRETATION:  MRI of the cervical and thoracic spine with contrast    CLINICAL INDICATION: optic neuritis as demonstrated by MRI of the brain    TECHNIQUE: Multiplanar, multisequence MR images of the cervical and   thoracic spine were obtained before and after the intravenous   administration of 7.5 cc of Gadavist. 0 cc were discarded    COMPARISON: None available    FINDINGS:     CERVICAL SPINE MRI: Vertebral body height, marrow signal homogeneity, and   facet alignment are maintained throughout the visualized spinal segments.   Mild disc space narrowing at C5-C6 and C7-T1. Cervicomedullary junction   unremarkable.    Abnormal midline ventral intrinsic cord signal at C2. Minimal prominence   of the central canal at the C3-C4 level. The spinal cord is not expanded.   There is no spinal cord compression. There is no abnormal enhancement in   the cervical region.    C2-C3: Mild bilateral facet hypertrophy. No spinal canal stenosis or   neural foraminal narrowing.    C3-C4: Small central disc protrusion deforms the ventral thecal sac. Mild   bilateral facet hypertrophy. No neuralforaminal narrowing.    C4-C5: Right foraminal disc protrusion and mild bilateral facet   hypertrophy. Mild to moderate right neural foraminal narrowing. No spinal   canal stenosis.    C5-C6: Broad-based disc protrusion deforms the ventral thecal sac. Mild   bilateral facet hypertrophy. No neural foraminal narrowing.    C6-C7: Mild bilateral facet hypertrophy. No spinal canal stenosis or   neural foraminal narrowing.    C7-T1: No spinal canal stenosis or neural foraminal narrowing.    THORACIC SPINE MRI: Vertebral body height, marrow signal homogeneity, and   facet alignment are maintained throughout the visualized spinal segments.     No abnormal intrinsic cord signal. No abnormal enhancement in the   thoracic region.    There is a mild dextroscoliosis of the thoracic spine, the apex is at   T5-T6.    From approximately T4-T5 through T6-T7, there is left anterolateral   displacement of the cord within the spinal canal with associated   flattening of the cord. Findings are suspicious for the presence of an   intradural arachnoid cyst.    There is no spinal canal stenosis or neural foraminal narrowing.    IMPRESSION:    CERVICAL SPINE MRI: Abnormal midline ventral intrinsic cord signal at C2   level. The lesion does not enhance. In thesetting of optic neuritis,   findings are suspicious for the presence of demyelinating disease such as   multiple sclerosis.    No abnormal enhancement in the cervical region.    C4-C5 mild to moderate right neural foraminal narrowing. Milder   degenerative changes at other levels.    THORACIC SPINE MRI: No abnormal intrinsic cord signal. No abnormal   enhancement in the thoracic region.    From approximately T4-T5 through T6-T7, there is left anterolateral   displacement of the cord within the spinal canal with associated   flattening of the cord. Findings are suspicious for the presence of an   intradural arachnoid cyst. If clinically indicated, correlation with   thoracic CT myelogram may be obtained for further evaluation.    Dr. Lizama discussed these findings with Dr. Shane on 12/24/2017 12:24   PM with read back.    < end of copied text >    < from: MR Orbits w/wo IV Cont (12.23.17 @ 09:53) >    IMPRESSION:    Evidence of T2 signal hyperintensity in the left optic nerve in its   intraorbital segment with some associated enhancement consistent with   optic neuritis. Intracranially there is evidence of signal hyperintensity   on T2 and FLAIR in the periventricular and subcortical white matter.   These lesions may be on the basis of demyelinating pathology given the   presence of optic neuritis though ischemic infectious or inflammatory   etiologies are not excluded and can be a cause of optic neuritis as well.   Of note the intracranial lesions do not enhance. The intracranial lesions   also do not demonstrate signal hyperintensity on the diffusion-weighted   sequence.      < end of copied text >
S: Patient seen and examined this am. Patient states vision is improving    MEDICATIONS  (STANDING):  dextrose 5%. 1000 milliLiter(s) (50 mL/Hr) IV Continuous <Continuous>  dextrose 50% Injectable 12.5 Gram(s) IV Push once  dextrose 50% Injectable 25 Gram(s) IV Push once  dextrose 50% Injectable 25 Gram(s) IV Push once  influenza   Vaccine 0.5 milliLiter(s) IntraMuscular once  insulin lispro (HumaLOG) corrective regimen sliding scale   SubCutaneous three times a day before meals  methylPREDNISolone sodium succinate IVPB 1000 milliGRAM(s) IV Intermittent daily  pantoprazole    Tablet 40 milliGRAM(s) Oral before breakfast  sodium chloride 0.9% lock flush 3 milliLiter(s) IV Push every 8 hours    MEDICATIONS  (PRN):  dextrose Gel 1 Dose(s) Oral once PRN Blood Glucose LESS THAN 70 milliGRAM(s)/deciliter  glucagon  Injectable 1 milliGRAM(s) IntraMuscular once PRN Glucose LESS THAN 70 milligrams/deciliter                          13.1   7.82  )-----------( 278      ( 24 Dec 2017 06:35 )             40.1     12-24    140  |  105  |  15  ----------------------------<  102<H>  4.1   |  23  |  0.91    Ca    9.2      24 Dec 2017 06:26    TPro  6.6  /  Alb  3.9  /  TBili  0.3  /  DBili  x   /  AST  17  /  ALT  21  /  AlkPhos  56  12-24    CAPILLARY BLOOD GLUCOSE      POCT Blood Glucose.: 129 mg/dL (25 Dec 2017 08:24)  POCT Blood Glucose.: 208 mg/dL (24 Dec 2017 21:51)  POCT Blood Glucose.: 216 mg/dL (24 Dec 2017 17:34)    PT/INR - ( 25 Dec 2017 08:20 )   PT: 10.8 sec;   INR: 0.96 ratio         PTT - ( 25 Dec 2017 08:20 )  PTT:29.9 sec    I&O's Summary    24 Dec 2017 07:01  -  25 Dec 2017 07:00  --------------------------------------------------------  IN: 940 mL / OUT: 0 mL / NET: 940 mL      Vital Signs Last 24 Hrs  T(C): 36.7 (25 Dec 2017 07:37), Max: 36.8 (24 Dec 2017 15:27)  T(F): 98.1 (25 Dec 2017 07:37), Max: 98.2 (24 Dec 2017 15:27)  HR: 79 (25 Dec 2017 07:37) (76 - 98)  BP: 105/65 (25 Dec 2017 07:37) (103/58 - 156/88)  BP(mean): --  RR: 18 (25 Dec 2017 07:37) (17 - 18)  SpO2: 96% (25 Dec 2017 07:37) (94% - 99%)    PERRL; EOMI	  Breath Sounds equal & clear to percussion & auscultation, no accessory muscle use	  Regular rate & rhythm, normal S1, S2; no murmurs, gallops or rubs; no S3, S4	  Soft, non-tender, no hepatosplenomegaly, normal bowel sounds	  detailed exam	  AAO*3, no aphasia or dysarthria	  PEERL, EOMI, V1-v3 intact, no facial asymmetry	  intact 5/5 in all four extremities	  intact to light and vibration	    < from: MR Cervical Spine w/wo IV Cont (12.24.17 @ 09:43) >    INTERPRETATION:  MRI of the cervical and thoracic spine with contrast    CLINICAL INDICATION: optic neuritis as demonstrated by MRI of the brain    TECHNIQUE: Multiplanar, multisequence MR images of the cervical and   thoracic spine were obtained before and after the intravenous   administration of 7.5 cc of Gadavist. 0 cc were discarded    COMPARISON: None available    FINDINGS:     CERVICAL SPINE MRI: Vertebral body height, marrow signal homogeneity, and   facet alignment are maintained throughout the visualized spinal segments.   Mild disc space narrowing at C5-C6 and C7-T1. Cervicomedullary junction   unremarkable.    Abnormal midline ventral intrinsic cord signal at C2. Minimal prominence   of the central canal at the C3-C4 level. The spinal cord is not expanded.   There is no spinal cord compression. There is no abnormal enhancement in   the cervical region.    C2-C3: Mild bilateral facet hypertrophy. No spinal canal stenosis or   neural foraminal narrowing.    C3-C4: Small central disc protrusion deforms the ventral thecal sac. Mild   bilateral facet hypertrophy. No neuralforaminal narrowing.    C4-C5: Right foraminal disc protrusion and mild bilateral facet   hypertrophy. Mild to moderate right neural foraminal narrowing. No spinal   canal stenosis.    C5-C6: Broad-based disc protrusion deforms the ventral thecal sac. Mild   bilateral facet hypertrophy. No neural foraminal narrowing.    C6-C7: Mild bilateral facet hypertrophy. No spinal canal stenosis or   neural foraminal narrowing.    C7-T1: No spinal canal stenosis or neural foraminal narrowing.    THORACIC SPINE MRI: Vertebral body height, marrow signal homogeneity, and   facet alignment are maintained throughout the visualized spinal segments.     No abnormal intrinsic cord signal. No abnormal enhancement in the   thoracic region.    There is a mild dextroscoliosis of the thoracic spine, the apex is at   T5-T6.    From approximately T4-T5 through T6-T7, there is left anterolateral   displacement of the cord within the spinal canal with associated   flattening of the cord. Findings are suspicious for the presence of an   intradural arachnoid cyst.    There is no spinal canal stenosis or neural foraminal narrowing.    IMPRESSION:    CERVICAL SPINE MRI: Abnormal midline ventral intrinsic cord signal at C2   level. The lesion does not enhance. In thesetting of optic neuritis,   findings are suspicious for the presence of demyelinating disease such as   multiple sclerosis.    No abnormal enhancement in the cervical region.    C4-C5 mild to moderate right neural foraminal narrowing. Milder   degenerative changes at other levels.    THORACIC SPINE MRI: No abnormal intrinsic cord signal. No abnormal   enhancement in the thoracic region.    From approximately T4-T5 through T6-T7, there is left anterolateral   displacement of the cord within the spinal canal with associated   flattening of the cord. Findings are suspicious for the presence of an   intradural arachnoid cyst. If clinically indicated, correlation with   thoracic CT myelogram may be obtained for further evaluation.    Dr. Lizama discussed these findings with Dr. Shane on 12/24/2017 12:24   PM with read back.    < end of copied text >    < from: MR Orbits w/wo IV Cont (12.23.17 @ 09:53) >    IMPRESSION:    Evidence of T2 signal hyperintensity in the left optic nerve in its   intraorbital segment with some associated enhancement consistent with   optic neuritis. Intracranially there is evidence of signal hyperintensity   on T2 and FLAIR in the periventricular and subcortical white matter.   These lesions may be on the basis of demyelinating pathology given the   presence of optic neuritis though ischemic infectious or inflammatory   etiologies are not excluded and can be a cause of optic neuritis as well.   Of note the intracranial lesions do not enhance. The intracranial lesions   also do not demonstrate signal hyperintensity on the diffusion-weighted   sequence.      < end of copied text >
CLINICAL INDICATION: new onset optic neuritis    Patient presents for fluoroscopically guided lumbar puncture.  Risks and benefits were discussed with the patient and/or patient health care proxy.  Risks discussed included bleeding, infection, nerve damage, and headache.       Status post lumbar puncture at the L2-L3 level utilizing a 20G spinal needle.      17cc of clear cerebral spinal fluid was obtained.    No immediate complications.
Ophthalmology follow up note    Patient examined beside, pending discharge. Reports mild improvement of symptoms.     Ophthalmology Exam    Visual acuity cc near: 20/20 OD, 20/25 OS  Pupils: brisk and reactive OD, 2+ APD OS  Ttono: 12 OU  Extraocular movements (EOMs): Full OU, no diplopia  Confrontational Visual Field (CVF):  Full OU  Color Plates: 12/12 OU  No red desaturation    Slit Lamp Exam (SLE)  External:  Flat OU  Lids/Lashes/Lacrimal Ducts: Flat OU    Sclera/Conjunctiva:  W+Q OU  Cornea: Cl OU  Anterior Chamber: D+F OU   Iris:  Flat OU  Lens:  Cl OU    Fundus Exam: undilated exam performed   Exam performed with 20D lens    Vitreous: wnl OU  Cup/Disc: sharp, mild temporal pallor, 0.3 OU  Macula:  wnl OU  Vessels:  wnl OU  Periphery: poor view    MRI ORBIT: Evidence of T2 signal hyperintensity in the left optic nerve in its   intraorbital segment with some associated enhancement consistent with   optic neuritis. Intracranially there is evidence of signal hyperintensity   on T2 and FLAIR in the periventricular and subcortical white matter.   These lesions may be on the basis of demyelinating pathology given the   presence of optic neuritis though ischemic infectious or inflammatory   etiologies are not excluded and can be a cause of optic neuritis as well.   Of note the intracranial lesions do not enhance. The intracranial lesions   also do not demonstrate signal hyperintensity on the diffusion-weighted   sequence.      Assessment/Plan: 61yo F with mild pain OS with EOM and 2+ APD OS. VA excellent, colors full. No signs of nerve swelling seen clinically however MRI with evidence of optic neuritis OS. There is T2 signal hyperintensity in left optic nerve as well as periventricular and subcortical white matter. Will need to eval for inflammatory vs infectious etiology.  - Neurology consulted, appreciate recs  - Labs pending s/p LP, to f/u outpatient   - pt to follow up with her private ophthalmologist or Dr Pires within 1 week from discharge    [facility]   [phone]    S/D/W attending

## 2017-12-26 NOTE — PROGRESS NOTE ADULT - ASSESSMENT
61yo F here for optic neuritis. L APD on exam. MRI orbits consistent with optic neuritis T2, flair lesions non-enhancing bilaterally periventruiuclar and subcortically and ventrally at C2 raising suspicion for MS    Impression     Optic neuritis due to MS vs NMO     Plan     Continue with Solumedrol 1 gram day 3  LP under fluoro today  LP at bedside attempted over weeknend, unsuccessful   NMO antibody  NSX consult for thoracic cord arachnoid cyst noted, nothing to do at this time   DVT prophylaxis

## 2017-12-26 NOTE — DISCHARGE NOTE ADULT - MEDICATION SUMMARY - MEDICATIONS TO TAKE
I will START or STAY ON the medications listed below when I get home from the hospital:    Multi Vitamin+ oral liquid  -- Indication: For supplement

## 2017-12-26 NOTE — DISCHARGE NOTE ADULT - PROVIDER TOKENS
FREE:[LAST:[Jones],FIRST:[Elmo],PHONE:[phone]],FAX:[phone]],ADDRESS:[[address] [place] [address]]]

## 2017-12-26 NOTE — DISCHARGE NOTE ADULT - CONDITIONS AT DISCHARGE
pt alert and oriented x4, OOB with stand by assist, 0/10 pain, and verbalizes an improvement in symptoms.

## 2017-12-27 LAB
NON-GYNECOLOGICAL CYTOLOGY STUDY: SIGNIFICANT CHANGE UP
OLIGOCLONAL BANDS CSF ELPH-IMP: PRESENT — SIGNIFICANT CHANGE UP
OLIGOCLONAL BANDS CSF ELPH-IMP: SIGNIFICANT CHANGE UP
PROT CSF-MCNC: 34 MG/DL — SIGNIFICANT CHANGE UP (ref 15–45)
VDRL CSF-TITR: NEGATIVE — SIGNIFICANT CHANGE UP

## 2017-12-29 LAB
AQP4 H2O CHANNEL AB SERPL IA-ACNC: NEGATIVE — SIGNIFICANT CHANGE UP
CULTURE RESULTS: NO GROWTH — SIGNIFICANT CHANGE UP
SPECIMEN SOURCE: SIGNIFICANT CHANGE UP
TM INTERPRETATION: SIGNIFICANT CHANGE UP
WNV IGG CSF IA-ACNC: NEGATIVE — SIGNIFICANT CHANGE UP
WNV IGM CSF IA-ACNC: NEGATIVE — SIGNIFICANT CHANGE UP

## 2018-01-01 LAB — B BURGDOR DNA SPEC QL NAA+PROBE: NEGATIVE — SIGNIFICANT CHANGE UP

## 2018-01-03 LAB — MBP CSF-MCNC: < 2 MCG/L — SIGNIFICANT CHANGE UP

## 2018-01-08 ENCOUNTER — TRANSCRIPTION ENCOUNTER (OUTPATIENT)
Age: 63
End: 2018-01-08

## 2018-02-08 ENCOUNTER — APPOINTMENT (OUTPATIENT)
Dept: NEUROLOGY | Facility: CLINIC | Age: 63
End: 2018-02-08
Payer: COMMERCIAL

## 2018-02-08 VITALS
DIASTOLIC BLOOD PRESSURE: 72 MMHG | WEIGHT: 158 LBS | HEIGHT: 64 IN | HEART RATE: 83 BPM | SYSTOLIC BLOOD PRESSURE: 100 MMHG | OXYGEN SATURATION: 98 % | BODY MASS INDEX: 26.98 KG/M2 | TEMPERATURE: 98.2 F

## 2018-02-08 DIAGNOSIS — H46.9 UNSPECIFIED OPTIC NEURITIS: ICD-10-CM

## 2018-02-08 PROCEDURE — 99215 OFFICE O/P EST HI 40 MIN: CPT

## 2018-02-09 PROBLEM — H46.9 OPTIC NEURITIS, LEFT: Status: ACTIVE | Noted: 2018-02-09

## 2018-02-09 RX ORDER — ADHESIVE TAPE 3"X 2.3 YD
50 MCG TAPE, NON-MEDICATED TOPICAL
Qty: 90 | Refills: 1 | Status: ACTIVE | COMMUNITY
Start: 2018-02-09

## 2018-02-14 LAB
CULTURE RESULTS: SIGNIFICANT CHANGE UP
SPECIMEN SOURCE: SIGNIFICANT CHANGE UP

## 2018-02-28 ENCOUNTER — APPOINTMENT (OUTPATIENT)
Dept: NEUROLOGY | Facility: CLINIC | Age: 63
End: 2018-02-28
Payer: COMMERCIAL

## 2018-02-28 ENCOUNTER — LABORATORY RESULT (OUTPATIENT)
Age: 63
End: 2018-02-28

## 2018-02-28 VITALS
DIASTOLIC BLOOD PRESSURE: 78 MMHG | BODY MASS INDEX: 27.83 KG/M2 | HEART RATE: 83 BPM | WEIGHT: 163 LBS | HEIGHT: 64 IN | SYSTOLIC BLOOD PRESSURE: 126 MMHG

## 2018-02-28 DIAGNOSIS — G35 MULTIPLE SCLEROSIS: ICD-10-CM

## 2018-02-28 PROCEDURE — 99215 OFFICE O/P EST HI 40 MIN: CPT

## 2018-03-01 LAB
ALBUMIN SERPL ELPH-MCNC: 4.3 G/DL
ALP BLD-CCNC: 59 U/L
ALT SERPL-CCNC: 29 U/L
AST SERPL-CCNC: 26 U/L
BASOPHILS # BLD AUTO: 0.02 K/UL
BASOPHILS NFR BLD AUTO: 0.3 %
BILIRUB DIRECT SERPL-MCNC: 0.1 MG/DL
BILIRUB INDIRECT SERPL-MCNC: 0.2 MG/DL
BILIRUB SERPL-MCNC: 0.3 MG/DL
CRP SERPL-MCNC: <0.2 MG/DL
DSDNA AB SER-ACNC: 25 IU/ML
ENA SS-A AB SER IA-ACNC: <0.2 AL
ENA SS-B AB SER IA-ACNC: <0.2 AL
EOSINOPHIL # BLD AUTO: 0.13 K/UL
EOSINOPHIL NFR BLD AUTO: 1.7 %
ERYTHROCYTE [SEDIMENTATION RATE] IN BLOOD BY WESTERGREN METHOD: 6 MM/HR
HCT VFR BLD CALC: 39.2 %
HGB BLD-MCNC: 13 G/DL
IMM GRANULOCYTES NFR BLD AUTO: 0.1 %
LYMPHOCYTES # BLD AUTO: 2.86 K/UL
LYMPHOCYTES NFR BLD AUTO: 38.4 %
MAN DIFF?: NORMAL
MCHC RBC-ENTMCNC: 28.6 PG
MCHC RBC-ENTMCNC: 33.2 GM/DL
MCV RBC AUTO: 86.3 FL
MONOCYTES # BLD AUTO: 0.57 K/UL
MONOCYTES NFR BLD AUTO: 7.7 %
NEUTROPHILS # BLD AUTO: 3.85 K/UL
NEUTROPHILS NFR BLD AUTO: 51.8 %
PLATELET # BLD AUTO: 305 K/UL
PROT SERPL-MCNC: 6.8 G/DL
RBC # BLD: 4.54 M/UL
RBC # FLD: 13.3 %
RHEUMATOID FACT SER QL: <7 IU/ML
TSH SERPL-ACNC: 3.44 UIU/ML
VIT B12 SERPL-MCNC: 512 PG/ML
WBC # FLD AUTO: 7.44 K/UL

## 2018-03-02 LAB
25(OH)D3 SERPL-MCNC: 29.2 NG/ML
ACE BLD-CCNC: 24 U/L
ADJUSTED MITOGEN: >10 IU/ML
ADJUSTED TB AG: 0 IU/ML
ANA SER IF-ACNC: NEGATIVE
M TB IFN-G BLD-IMP: NEGATIVE
QUANTIFERON GOLD NIL: 0.02 IU/ML
VZV AB TITR SER: POSITIVE
VZV IGG SER IF-ACNC: 2783 INDEX

## 2018-03-08 LAB
JCV INDEX: 0.22
STRATIFY JCV ANTIBODY: ABNORMAL

## 2018-03-21 ENCOUNTER — APPOINTMENT (OUTPATIENT)
Dept: NEUROLOGY | Facility: CLINIC | Age: 63
End: 2018-03-21
Payer: COMMERCIAL

## 2018-03-21 VITALS
HEART RATE: 103 BPM | SYSTOLIC BLOOD PRESSURE: 118 MMHG | HEIGHT: 64 IN | BODY MASS INDEX: 27.83 KG/M2 | DIASTOLIC BLOOD PRESSURE: 74 MMHG | WEIGHT: 163 LBS

## 2018-03-21 PROCEDURE — 99215 OFFICE O/P EST HI 40 MIN: CPT

## 2018-06-08 RX ORDER — GLATIRAMER 40 MG/ML
40 INJECTION, SOLUTION SUBCUTANEOUS
Qty: 12 | Refills: 5 | Status: ACTIVE | COMMUNITY
Start: 2018-06-08 | End: 1900-01-01

## 2018-11-20 PROBLEM — H46.9 UNSPECIFIED OPTIC NEURITIS: Chronic | Status: ACTIVE | Noted: 2017-12-23

## 2018-11-28 ENCOUNTER — APPOINTMENT (OUTPATIENT)
Dept: OBGYN | Facility: CLINIC | Age: 63
End: 2018-11-28
Payer: COMMERCIAL

## 2018-11-28 VITALS
SYSTOLIC BLOOD PRESSURE: 122 MMHG | BODY MASS INDEX: 27.66 KG/M2 | WEIGHT: 162 LBS | DIASTOLIC BLOOD PRESSURE: 68 MMHG | HEIGHT: 64 IN

## 2018-11-28 DIAGNOSIS — Z83.3 FAMILY HISTORY OF DIABETES MELLITUS: ICD-10-CM

## 2018-11-28 PROCEDURE — 99396 PREV VISIT EST AGE 40-64: CPT

## 2018-11-28 PROCEDURE — 82270 OCCULT BLOOD FECES: CPT

## 2018-11-29 ENCOUNTER — MESSAGE (OUTPATIENT)
Age: 63
End: 2018-11-29

## 2018-11-29 LAB — HPV HIGH+LOW RISK DNA PNL CVX: NOT DETECTED

## 2018-12-03 ENCOUNTER — MESSAGE (OUTPATIENT)
Age: 63
End: 2018-12-03

## 2018-12-03 LAB — CYTOLOGY CVX/VAG DOC THIN PREP: NORMAL

## 2018-12-05 ENCOUNTER — APPOINTMENT (OUTPATIENT)
Dept: OBGYN | Facility: CLINIC | Age: 63
End: 2018-12-05
Payer: COMMERCIAL

## 2018-12-05 ENCOUNTER — ASOB RESULT (OUTPATIENT)
Age: 63
End: 2018-12-05

## 2018-12-05 PROCEDURE — 76830 TRANSVAGINAL US NON-OB: CPT

## 2018-12-07 ENCOUNTER — MESSAGE (OUTPATIENT)
Age: 63
End: 2018-12-07

## 2019-05-01 ENCOUNTER — APPOINTMENT (OUTPATIENT)
Dept: OBGYN | Facility: CLINIC | Age: 64
End: 2019-05-01
Payer: COMMERCIAL

## 2019-05-01 ENCOUNTER — ASOB RESULT (OUTPATIENT)
Age: 64
End: 2019-05-01

## 2019-05-01 VITALS
SYSTOLIC BLOOD PRESSURE: 124 MMHG | BODY MASS INDEX: 28.17 KG/M2 | WEIGHT: 165 LBS | DIASTOLIC BLOOD PRESSURE: 72 MMHG | HEIGHT: 64 IN

## 2019-05-01 DIAGNOSIS — N94.9 UNSPECIFIED CONDITION ASSOCIATED WITH FEMALE GENITAL ORGANS AND MENSTRUAL CYCLE: ICD-10-CM

## 2019-05-01 DIAGNOSIS — N63.0 UNSPECIFIED LUMP IN UNSPECIFIED BREAST: ICD-10-CM

## 2019-05-01 PROCEDURE — 99214 OFFICE O/P EST MOD 30 MIN: CPT

## 2019-05-01 PROCEDURE — 76830 TRANSVAGINAL US NON-OB: CPT

## 2019-05-01 NOTE — CHIEF COMPLAINT
[FreeTextEntry1] : 62yo presents for evaluation and management ofa left paraovarian cyst, with possible interval growth  from 1.2 to 1.6 cm.\par She also had a breast mass , with negative mammo and sono

## 2019-05-01 NOTE — PHYSICAL EXAM
[Awake] : awake [Alert] : alert [Acute Distress] : no acute distress [Mass] : breast mass [Nipple Discharge] : no nipple discharge [Axillary LAD] : no axillary lymphadenopathy [Soft] : soft [Oriented x3] : oriented to person, place, and time [Tender] : non tender [Normal] : uterus [Uterine Adnexae] : were not tender and not enlarged [No Bleeding] : there was no active vaginal bleeding

## 2019-05-02 ENCOUNTER — MESSAGE (OUTPATIENT)
Age: 64
End: 2019-05-02

## 2019-06-10 ENCOUNTER — APPOINTMENT (OUTPATIENT)
Dept: OBGYN | Facility: CLINIC | Age: 64
End: 2019-06-10

## 2021-01-13 ENCOUNTER — ASOB RESULT (OUTPATIENT)
Age: 66
End: 2021-01-13

## 2021-01-13 ENCOUNTER — APPOINTMENT (OUTPATIENT)
Dept: OBGYN | Facility: CLINIC | Age: 66
End: 2021-01-13
Payer: MEDICARE

## 2021-01-13 VITALS
DIASTOLIC BLOOD PRESSURE: 74 MMHG | BODY MASS INDEX: 28.17 KG/M2 | SYSTOLIC BLOOD PRESSURE: 109 MMHG | WEIGHT: 165 LBS | HEIGHT: 64 IN

## 2021-01-13 DIAGNOSIS — Z12.11 ENCOUNTER FOR SCREENING FOR MALIGNANT NEOPLASM OF COLON: ICD-10-CM

## 2021-01-13 DIAGNOSIS — Z12.12 ENCOUNTER FOR SCREENING FOR MALIGNANT NEOPLASM OF COLON: ICD-10-CM

## 2021-01-13 PROCEDURE — 99072 ADDL SUPL MATRL&STAF TM PHE: CPT

## 2021-01-13 PROCEDURE — 99397 PER PM REEVAL EST PAT 65+ YR: CPT

## 2021-01-13 PROCEDURE — 82270 OCCULT BLOOD FECES: CPT

## 2021-01-13 PROCEDURE — 76830 TRANSVAGINAL US NON-OB: CPT

## 2021-01-13 NOTE — HISTORY OF PRESENT ILLNESS
[FreeTextEntry1] : 64yo,  with gyn history significant for:\par 1.Left paraovarian 1.6cm cyst\par 2.Benign breast biopsy\par 3.Bladder repair\par LMP 2007

## 2021-01-13 NOTE — PLAN
[FreeTextEntry1] : 64yo with asymptomatic prolapse and cystocele.\par She sees \par Plan:\par 1.TVS re:adnexal cyst\par 2.BV re:c/o discharge\par 3.PAP\par 4.BMD\par 5.Colonoscopy

## 2021-01-13 NOTE — PHYSICAL EXAM
[Examination Of The Breasts] : a normal appearance [No Masses] : no breast masses were palpable [Labia Majora] : normal [Labia Minora] : normal [Cystocele] : a cystocele [Discharge] : a  ~M vaginal discharge was present [Scant] : scant [Foul Smelling] : not foul smelling [Clear] : clear [Normal] : normal [Uterine Adnexae] : normal [No Tenderness] : no tenderness [Nl Sphincter Tone] : normal sphincter tone [External Hemorrhoid] : external hemorrhoid [FreeTextEntry5] : @introitus

## 2021-01-14 ENCOUNTER — NON-APPOINTMENT (OUTPATIENT)
Age: 66
End: 2021-01-14

## 2021-01-15 ENCOUNTER — NON-APPOINTMENT (OUTPATIENT)
Age: 66
End: 2021-01-15

## 2021-01-15 DIAGNOSIS — N89.8 OTHER SPECIFIED NONINFLAMMATORY DISORDERS OF VAGINA: ICD-10-CM

## 2021-01-15 LAB
CANDIDA VAG CYTO: NOT DETECTED
G VAGINALIS+PREV SP MTYP VAG QL MICRO: DETECTED
T VAGINALIS VAG QL WET PREP: NOT DETECTED

## 2021-01-17 LAB — CYTOLOGY CVX/VAG DOC THIN PREP: NORMAL

## 2021-06-28 ENCOUNTER — APPOINTMENT (OUTPATIENT)
Dept: OBGYN | Facility: CLINIC | Age: 66
End: 2021-06-28
Payer: MEDICARE

## 2021-06-28 ENCOUNTER — ASOB RESULT (OUTPATIENT)
Age: 66
End: 2021-06-28

## 2021-06-28 PROCEDURE — 76830 TRANSVAGINAL US NON-OB: CPT

## 2021-06-29 ENCOUNTER — MESSAGE (OUTPATIENT)
Age: 66
End: 2021-06-29

## 2021-12-31 NOTE — H&P ADULT - FAMILY HISTORY
----- Message from CALVIN Ludwig sent at 12/30/2021 12:51 PM PST -----  Please have pt set appointment to review labs.        
Nothing concerning so far but not all results done yet  
Pt notified.  
Pt sched 1/4/21. Pt is asking if there was anything concerning? Please advise.  
No pertinent family history in first degree relatives

## 2022-08-04 NOTE — DISCHARGE NOTE ADULT - MEDICATION SUMMARY - MEDICATIONS TO STOP TAKING
I will STOP taking the medications listed below when I get home from the hospital:  None This was a shared visit with the KHALIF. I reviewed and verified the documentation and independently performed the documented:

## 2023-02-02 NOTE — ED CDU PROVIDER SUBSEQUENT DAY NOTE - NS ED MD CDU HISTORY DATE TIME DT
23-Dec-2017 00:42 How Many Mls Were Removed From The 40 Mg/Ml (10ml) Vial When Preparing The Injectable Solution?: 0

## 2023-03-22 NOTE — ED ADULT NURSE REASSESSMENT NOTE - GENITOURINARY ASSESSMENT
WDL Rinvoq Pregnancy And Lactation Text: Based on animal studies, Rinvoq may cause embryo-fetal harm when administered to pregnant women.  The medication should not be used in pregnancy.  Breastfeeding is not recommended during treatment and for 6 days after the last dose.

## 2023-07-11 ENCOUNTER — APPOINTMENT (OUTPATIENT)
Dept: OBGYN | Facility: CLINIC | Age: 68
End: 2023-07-11
Payer: MEDICARE

## 2023-07-11 DIAGNOSIS — Z01.419 ENCOUNTER FOR GYNECOLOGICAL EXAMINATION (GENERAL) (ROUTINE) W/OUT ABNORMAL FINDINGS: ICD-10-CM

## 2023-07-11 DIAGNOSIS — N81.4 UTEROVAGINAL PROLAPSE, UNSPECIFIED: ICD-10-CM

## 2023-07-11 PROCEDURE — G0101: CPT

## 2023-07-11 RX ORDER — METRONIDAZOLE 7.5 MG/G
0.75 GEL VAGINAL
Qty: 1 | Refills: 0 | Status: COMPLETED | COMMUNITY
Start: 2021-01-15 | End: 2023-07-11

## 2023-07-11 NOTE — PHYSICAL EXAM
[Appropriately responsive] : appropriately responsive [Alert] : alert [No Acute Distress] : no acute distress [No Lymphadenopathy] : no lymphadenopathy [Regular Rate Rhythm] : regular rate rhythm [No Murmurs] : no murmurs [Clear to Auscultation B/L] : clear to auscultation bilaterally [Soft] : soft [Non-tender] : non-tender [Non-distended] : non-distended [No HSM] : No HSM [No Lesions] : no lesions [No Mass] : no mass [Oriented x3] : oriented x3 [Examination Of The Breasts] : a normal appearance [No Masses] : no breast masses were palpable [Labia Majora] : normal [Labia Minora] : normal [Uterine Prolapse] : uterine prolapse [Normal] : normal [Uterine Adnexae] : normal [FreeTextEntry4] : First degree

## 2023-07-13 LAB — CYTOLOGY CVX/VAG DOC THIN PREP: NORMAL

## 2024-02-25 NOTE — ED CLERICAL - DIVISION
[facility]... Alert and oriented to person, place, time/situation. normal mood and affect. no apparent risk to self or others.

## 2024-07-16 ENCOUNTER — APPOINTMENT (OUTPATIENT)
Dept: OBGYN | Facility: CLINIC | Age: 69
End: 2024-07-16

## 2024-07-16 VITALS
DIASTOLIC BLOOD PRESSURE: 66 MMHG | WEIGHT: 152 LBS | SYSTOLIC BLOOD PRESSURE: 99 MMHG | HEIGHT: 64 IN | BODY MASS INDEX: 25.95 KG/M2

## 2024-07-16 DIAGNOSIS — Z01.419 ENCOUNTER FOR GYNECOLOGICAL EXAMINATION (GENERAL) (ROUTINE) W/OUT ABNORMAL FINDINGS: ICD-10-CM

## 2024-07-16 DIAGNOSIS — N81.4 UTEROVAGINAL PROLAPSE, UNSPECIFIED: ICD-10-CM

## 2024-07-16 PROCEDURE — 99397 PER PM REEVAL EST PAT 65+ YR: CPT | Mod: GY

## 2024-07-16 PROCEDURE — G0101: CPT

## 2024-07-22 LAB — CYTOLOGY CVX/VAG DOC THIN PREP: NORMAL

## 2024-07-29 ENCOUNTER — APPOINTMENT (OUTPATIENT)
Dept: OBGYN | Facility: CLINIC | Age: 69
End: 2024-07-29
Payer: MEDICARE

## 2024-07-29 ENCOUNTER — ASOB RESULT (OUTPATIENT)
Age: 69
End: 2024-07-29

## 2024-07-29 PROCEDURE — 76830 TRANSVAGINAL US NON-OB: CPT

## 2024-07-31 NOTE — ED CDU PROVIDER INITIAL DAY NOTE - NEURO NEGATIVE STATEMENT, MLM
Patient no loss of consciousness, no gait abnormality, no headache, no sensory deficits, and no weakness.

## 2025-07-23 ENCOUNTER — APPOINTMENT (OUTPATIENT)
Dept: OBGYN | Facility: CLINIC | Age: 70
End: 2025-07-23
Payer: MEDICARE

## 2025-07-23 VITALS
BODY MASS INDEX: 26.46 KG/M2 | HEIGHT: 64 IN | DIASTOLIC BLOOD PRESSURE: 70 MMHG | WEIGHT: 155 LBS | SYSTOLIC BLOOD PRESSURE: 109 MMHG

## 2025-07-23 DIAGNOSIS — Z01.419 ENCOUNTER FOR GYNECOLOGICAL EXAMINATION (GENERAL) (ROUTINE) W/OUT ABNORMAL FINDINGS: ICD-10-CM

## 2025-07-23 DIAGNOSIS — N81.4 UTEROVAGINAL PROLAPSE, UNSPECIFIED: ICD-10-CM

## 2025-07-23 PROCEDURE — G0101: CPT | Mod: GA

## 2025-07-29 LAB — CYTOLOGY CVX/VAG DOC THIN PREP: NORMAL

## 2025-08-18 ENCOUNTER — APPOINTMENT (OUTPATIENT)
Dept: UROGYNECOLOGY | Facility: CLINIC | Age: 70
End: 2025-08-18
Payer: MEDICARE

## 2025-08-18 VITALS
HEIGHT: 64 IN | DIASTOLIC BLOOD PRESSURE: 74 MMHG | BODY MASS INDEX: 25.95 KG/M2 | SYSTOLIC BLOOD PRESSURE: 119 MMHG | HEART RATE: 96 BPM | WEIGHT: 152 LBS

## 2025-08-18 DIAGNOSIS — N81.11 CYSTOCELE, MIDLINE: ICD-10-CM

## 2025-08-18 DIAGNOSIS — N36.41 HYPERMOBILITY OF URETHRA: ICD-10-CM

## 2025-08-18 DIAGNOSIS — N81.2 INCOMPLETE UTEROVAGINAL PROLAPSE: ICD-10-CM

## 2025-08-18 DIAGNOSIS — R33.9 RETENTION OF URINE, UNSPECIFIED: ICD-10-CM

## 2025-08-18 DIAGNOSIS — N81.6 RECTOCELE: ICD-10-CM

## 2025-08-18 DIAGNOSIS — N39.46 MIXED INCONTINENCE: ICD-10-CM

## 2025-08-18 LAB
BILIRUB UR QL STRIP: NORMAL
CLARITY UR: CLEAR
COLLECTION METHOD: NORMAL
GLUCOSE UR-MCNC: NORMAL
HCG UR QL: 0.2 EU/DL
HGB UR QL STRIP.AUTO: NORMAL
KETONES UR-MCNC: NORMAL
LEUKOCYTE ESTERASE UR QL STRIP: NORMAL
NITRITE UR QL STRIP: NORMAL
PH UR STRIP: 6
PROT UR STRIP-MCNC: NORMAL
SP GR UR STRIP: 1.01

## 2025-08-18 PROCEDURE — 81003 URINALYSIS AUTO W/O SCOPE: CPT | Mod: QW

## 2025-08-18 PROCEDURE — 99459 PELVIC EXAMINATION: CPT

## 2025-08-18 PROCEDURE — 99215 OFFICE O/P EST HI 40 MIN: CPT | Mod: 25

## 2025-08-18 PROCEDURE — 51701 INSERT BLADDER CATHETER: CPT

## 2025-08-19 ENCOUNTER — APPOINTMENT (OUTPATIENT)
Dept: ULTRASOUND IMAGING | Facility: CLINIC | Age: 70
End: 2025-08-19
Payer: MEDICARE

## 2025-08-19 PROCEDURE — 76830 TRANSVAGINAL US NON-OB: CPT

## 2025-08-19 PROCEDURE — 76856 US EXAM PELVIC COMPLETE: CPT

## 2025-08-29 DIAGNOSIS — Z80.7 FAMILY HISTORY OF OTHER MALIGNANT NEOPLASMS OF LYMPHOID, HEMATOPOIETIC AND RELATED TISSUES: ICD-10-CM

## 2025-08-29 DIAGNOSIS — G35 MULTIPLE SCLEROSIS: ICD-10-CM

## 2025-08-29 DIAGNOSIS — Z80.0 FAMILY HISTORY OF MALIGNANT NEOPLASM OF DIGESTIVE ORGANS: ICD-10-CM

## 2025-08-29 DIAGNOSIS — Z82.3 FAMILY HISTORY OF STROKE: ICD-10-CM

## 2025-08-29 DIAGNOSIS — Z85.828 PERSONAL HISTORY OF OTHER MALIGNANT NEOPLASM OF SKIN: ICD-10-CM

## 2025-08-29 DIAGNOSIS — Z78.9 OTHER SPECIFIED HEALTH STATUS: ICD-10-CM

## 2025-08-29 DIAGNOSIS — Z84.1 FAMILY HISTORY OF DISORDERS OF KIDNEY AND URETER: ICD-10-CM
